# Patient Record
Sex: FEMALE | Race: WHITE
[De-identification: names, ages, dates, MRNs, and addresses within clinical notes are randomized per-mention and may not be internally consistent; named-entity substitution may affect disease eponyms.]

---

## 2018-01-19 ENCOUNTER — HOSPITAL ENCOUNTER (EMERGENCY)
Dept: HOSPITAL 84 - D.ER | Age: 33
LOS: 1 days | Discharge: HOME | End: 2018-01-20
Payer: MEDICAID

## 2018-01-19 VITALS — BODY MASS INDEX: 29.7 KG/M2

## 2018-01-19 DIAGNOSIS — N39.0: Primary | ICD-10-CM

## 2018-01-19 DIAGNOSIS — F17.200: ICD-10-CM

## 2018-01-19 LAB
APPEARANCE UR: (no result)
BACTERIA #/AREA URNS HPF: (no result) /HPF
BASOPHILS NFR BLD AUTO: 0.2 % (ref 0–2)
BILIRUB SERPL-MCNC: NEGATIVE MG/DL
COLOR UR: YELLOW
EOSINOPHIL NFR BLD: 0.6 % (ref 0–7)
ERYTHROCYTE [DISTWIDTH] IN BLOOD BY AUTOMATED COUNT: 12.5 % (ref 11.5–14.5)
GLUCOSE SERPL-MCNC: NEGATIVE MG/DL
HCT VFR BLD CALC: 42 % (ref 36–48)
HGB BLD-MCNC: 14.5 G/DL (ref 12–16)
IMM GRANULOCYTES NFR BLD: 0.3 % (ref 0–5)
KETONES UR STRIP-MCNC: NEGATIVE MG/DL
LYMPHOCYTES NFR BLD AUTO: 12.6 % (ref 15–50)
MCH RBC QN AUTO: 29.5 PG (ref 26–34)
MCHC RBC AUTO-ENTMCNC: 34.5 G/DL (ref 31–37)
MCV RBC: 85.5 FL (ref 80–100)
MONOCYTES NFR BLD: 8.6 % (ref 2–11)
MUCOUS THREADS #/AREA URNS LPF: (no result) /LPF
NEUTROPHILS NFR BLD AUTO: 77.7 % (ref 40–80)
NITRITE UR-MCNC: POSITIVE MG/ML
PH UR STRIP: 5 [PH] (ref 5–6)
PLATELET # BLD: 271 10X3/UL (ref 130–400)
PMV BLD AUTO: 9.3 FL (ref 7.4–10.4)
PROT UR-MCNC: (no result) MG/DL
RBC # BLD AUTO: 4.91 10X6/UL (ref 4–5.4)
SP GR UR STRIP: 1.01 (ref 1–1.02)
SQUAMOUS #/AREA URNS HPF: (no result) /HPF (ref 0–5)
UROBILINOGEN UR-MCNC: NORMAL MG/DL
WBC # BLD AUTO: 11.1 10X3/UL (ref 4.8–10.8)
WBC #/AREA URNS HPF: (no result) /HPF (ref 0–5)

## 2018-01-20 LAB
ALBUMIN SERPL-MCNC: 3.7 G/DL (ref 3.4–5)
ALP SERPL-CCNC: 48 U/L (ref 46–116)
ALT SERPL-CCNC: 32 U/L (ref 10–68)
AMYLASE SERPL-CCNC: 65 U/L (ref 25–115)
ANION GAP SERPL CALC-SCNC: 13.2 MMOL/L (ref 8–16)
BILIRUB SERPL-MCNC: 0.15 MG/DL (ref 0.2–1.3)
BUN SERPL-MCNC: 12 MG/DL (ref 7–18)
CALCIUM SERPL-MCNC: 9.1 MG/DL (ref 8.5–10.1)
CHLORIDE SERPL-SCNC: 104 MMOL/L (ref 98–107)
CO2 SERPL-SCNC: 23.7 MMOL/L (ref 21–32)
CREAT SERPL-MCNC: 0.8 MG/DL (ref 0.6–1.3)
GLOBULIN SER-MCNC: 3.7 G/L
GLUCOSE SERPL-MCNC: 115 MG/DL (ref 74–106)
LIPASE SERPL-CCNC: 209 U/L (ref 73–393)
OSMOLALITY SERPL CALC.SUM OF ELEC: 274 MOSM/KG (ref 275–300)
POTASSIUM SERPL-SCNC: 3.9 MMOL/L (ref 3.5–5.1)
PROT SERPL-MCNC: 7.4 G/DL (ref 6.4–8.2)
SODIUM SERPL-SCNC: 137 MMOL/L (ref 136–145)

## 2018-06-03 ENCOUNTER — HOSPITAL ENCOUNTER (EMERGENCY)
Dept: HOSPITAL 84 - D.ER | Age: 33
Discharge: HOME | End: 2018-06-03
Payer: SELF-PAY

## 2018-06-03 VITALS — BODY MASS INDEX: 29.7 KG/M2

## 2018-06-03 DIAGNOSIS — H57.13: Primary | ICD-10-CM
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
## 2018-06-03 DIAGNOSIS — S05.02XA: ICD-10-CM

## 2018-06-03 DIAGNOSIS — S05.01XA: ICD-10-CM
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
## 2018-06-03 DIAGNOSIS — F17.200: ICD-10-CM

## 2019-02-15 ENCOUNTER — HOSPITAL ENCOUNTER (INPATIENT)
Dept: HOSPITAL 84 - D.LD | Age: 34
LOS: 21 days | Discharge: HOME | End: 2019-03-08
Attending: OBSTETRICS & GYNECOLOGY | Admitting: OBSTETRICS & GYNECOLOGY
Payer: COMMERCIAL

## 2019-02-15 VITALS — WEIGHT: 180 LBS | BODY MASS INDEX: 28.93 KG/M2 | BODY MASS INDEX: 28.93 KG/M2 | HEIGHT: 66 IN

## 2019-02-15 DIAGNOSIS — Z3A.39: ICD-10-CM

## 2019-02-15 DIAGNOSIS — O36.8130: Primary | ICD-10-CM

## 2019-03-06 VITALS — DIASTOLIC BLOOD PRESSURE: 70 MMHG | SYSTOLIC BLOOD PRESSURE: 133 MMHG

## 2019-03-06 LAB
ALBUMIN SERPL-MCNC: 2.3 G/DL (ref 3.4–5)
ALP SERPL-CCNC: 136 U/L (ref 46–116)
ALT SERPL-CCNC: 11 U/L (ref 10–68)
AMPHETAMINES UR QL SCN: NEGATIVE QUAL
ANION GAP SERPL CALC-SCNC: 13.8 MMOL/L (ref 8–16)
BARBITURATES UR QL SCN: NEGATIVE QUAL
BENZODIAZ UR QL SCN: NEGATIVE QUAL
BILIRUB SERPL-MCNC: 0.14 MG/DL (ref 0.2–1.3)
BUN SERPL-MCNC: 10 MG/DL (ref 7–18)
BZE UR QL SCN: NEGATIVE QUAL
CALCIUM SERPL-MCNC: 8 MG/DL (ref 8.5–10.1)
CANNABINOIDS UR QL SCN: NEGATIVE QUAL
CHLORIDE SERPL-SCNC: 104 MMOL/L (ref 98–107)
CO2 SERPL-SCNC: 23.7 MMOL/L (ref 21–32)
CREAT SERPL-MCNC: 0.5 MG/DL (ref 0.6–1.3)
ERYTHROCYTE [DISTWIDTH] IN BLOOD BY AUTOMATED COUNT: 13.3 % (ref 11.5–14.5)
GLOBULIN SER-MCNC: 3.7 G/L
GLUCOSE SERPL-MCNC: 79 MG/DL (ref 74–106)
HCT VFR BLD CALC: 34.8 % (ref 36–48)
HGB BLD-MCNC: 11.7 G/DL (ref 12–16)
LDH1 SERPL-CCNC: 156 U/L (ref 81–234)
MCH RBC QN AUTO: 28.7 PG (ref 26–34)
MCHC RBC AUTO-ENTMCNC: 33.6 G/DL (ref 31–37)
MCV RBC: 85.5 FL (ref 80–100)
OPIATES UR QL SCN: NEGATIVE QUAL
OSMOLALITY SERPL CALC.SUM OF ELEC: 273 MOSM/KG (ref 275–300)
PCP UR QL SCN: NEGATIVE QUAL
PLATELET # BLD: 251 10X3/UL (ref 130–400)
PMV BLD AUTO: 10 FL (ref 7.4–10.4)
POTASSIUM SERPL-SCNC: 3.5 MMOL/L (ref 3.5–5.1)
PROT SERPL-MCNC: 6 G/DL (ref 6.4–8.2)
RBC # BLD AUTO: 4.07 10X6/UL (ref 4–5.4)
SODIUM SERPL-SCNC: 138 MMOL/L (ref 136–145)
URATE SERPL-MCNC: 4.3 MG/DL (ref 2.6–7.2)
WBC # BLD AUTO: 10.4 10X3/UL (ref 4.8–10.8)

## 2019-03-06 PROCEDURE — 10907ZC DRAINAGE OF AMNIOTIC FLUID, THERAPEUTIC FROM PRODUCTS OF CONCEPTION, VIA NATURAL OR ARTIFICIAL OPENING: ICD-10-PCS | Performed by: OBSTETRICS & GYNECOLOGY

## 2019-03-06 PROCEDURE — 3E033VJ INTRODUCTION OF OTHER HORMONE INTO PERIPHERAL VEIN, PERCUTANEOUS APPROACH: ICD-10-PCS | Performed by: OBSTETRICS & GYNECOLOGY

## 2019-03-07 VITALS — DIASTOLIC BLOOD PRESSURE: 62 MMHG | SYSTOLIC BLOOD PRESSURE: 126 MMHG

## 2019-03-07 LAB
CREATININE - URINE: 71.3 MG/DL (ref 30–125)
PROT UR-MCNC: 35.3 MG/DL (ref 0–11.9)
PROT/CREAT UR: 0.5 MG/G

## 2019-03-07 PROCEDURE — 0HB9XZZ EXCISION OF PERINEUM SKIN, EXTERNAL APPROACH: ICD-10-PCS | Performed by: OBSTETRICS & GYNECOLOGY

## 2019-03-07 PROCEDURE — 0HQ9XZZ REPAIR PERINEUM SKIN, EXTERNAL APPROACH: ICD-10-PCS | Performed by: OBSTETRICS & GYNECOLOGY

## 2019-03-07 NOTE — NUR
Called to room,  pt states that she feels increased pressure in perineum and
believes this is being caused by hemorrhoids that she noticed while showering,
Orders checked and pharmacy notified for analpram cream.

## 2019-03-07 NOTE — NUR
AM assessement completed as charted on flowsheet.  pt complains of back aches
that she rates at 5/10 as well as lower abd cramping. Fundus firm at u/u with
light bleeding noted to geoff pad,  she denies clots with voids and that
bleeding is less than her normal period.  saline lock to left hand patent and
flushed easily with 5ml NS.  Epidural cath noted to be still be in place as
capped off, due to pt scheduled BTL later today.  Meds given per orders as
scanned to emar. side rails up x 2 with phone and call light in reach.

## 2019-03-07 NOTE — NUR
infant taken to nursery via crib with nursery nurse.  Called to room by pt to
remove epidural cath.  Pt sitting up on side of bed, epidural cath removed
easily with black tip intact and verified this with patient.  Saline lock
removed intact also due to pt complaint of tenderness with touch. No reddness
noted prior to removeal.  Provided with towels for shower when she is ready.

## 2019-03-07 NOTE — NUR
Pt rates pain at 3/10 and denies "achy" feeling in her back. she also states
that after talking with her spouse more she has choosen to wait on tubal
ligation.  Denies any questions or concerns at this time.  Side rails up x 2
with phone and call light in reach and infant in crib at bedside.

## 2019-03-07 NOTE — NUR
ROUNDS MADE. PT WITH INFANT SKIN TO SKIN. DENIES NEEDS AT THIS TIME. BED IN
LOW POSITION WITH UPPER SIDE RAILS RAISED X2. CALL LIGHT AND PHONE WITHIN
REACH. WILL CONTINUE TO MONITOR AND ASSIST PRN.

## 2019-03-07 NOTE — NUR
PT'S GRANDMOTHER TO NURSE DESK REQUESTING [employer] FOR PT TO SHOWER. SAME
PROVIDED. PACIFIER PROVIDED PER REQUEST. NO FURTHER NEEDS VOICED.

## 2019-03-07 NOTE — NUR
RESTING WITH EYES CLOSED IN SEMI-FOWLERS POSITION. INFANT RESTING IN OPEN
CRIB. RESPIRATIONS REGULAR AND UNLABORED, NO S/S OF DISTRESS NOTED. BED IN LOW
POSITION WITH UPPER SIDE RAILS RAISED X2. CALL LIGHT AND PHONE WITHIN REACH.
WILL CONTINUE TO MONITOR AND ASSIST PRN.

## 2019-03-07 NOTE — NUR
CONVERSING WITH VISITORS AND OLDER CHILDREN AT THIS TIME. REQUESTS ASSESSMENT
BE COMPLETED FOLLOWING FAMILY LEAVING UNIT. STATES THAT SHE WILL NOTIFY RN
WHEN FAMILY LEAVES. STATES PAIN 3/10 AND THAT TYLENOL AND MOTRIN HELPED
RELIEVE PAIN TREMENDOUSLY. DENIES NEEDS AT THIS TIME. BED IN LOW POSITION WITH
UPPER SIDE RAILS RAISED X2. CALL LIGHT AND PHONE WITHIN REACH. WILL CONTINUE
TO MONITOR AND ASSIST PRN.

## 2019-03-07 NOTE — NUR
ROUNDS MADE. PT RESTING ON LEFT SIDE. RESPIRATIONS REGULAR AND UNLABORED, NO
S/S OF DISTRESS NOTED. INFANT RESTING IN OPEN CRIB AT BEDSIDE. BED IN LOW
POSITION WITH UPPER SIDE RAILS RAISED X2. CALL LIGHT AND PHONE WITHIN REACH.
WILL CONTINUE TO MONITOR AND ASSIST PRN.

## 2019-03-07 NOTE — NUR
Pt calls out requesting motrin and or tylenol if possible.  This RN to room
with meds given as scanned to emar. Additonal geoff pads placed in bathroom as
requested.  Family at bedside and infant in room.  Call light within patient
reach.

## 2019-03-07 NOTE — NUR
ROUNDS MADE. NO VISITORS PRESENT. PT STATES THAT RN CAN PERFORM SHIFT
ASSESSMENT. VSS. FUNDUS FIRM, MIDLINE AND U2 WITH SMALL AMT RUBRA LOCHIA TO
PERIPAD, NO CLOTS NOTED. PT DENIES PASSING CLOTS. STATES THAT SHE IF VOIDING
WITHOUT DIFFICULTY AND PASSING FLATUS. RESPIRATION REGULAR AND UNLABORED,
BREATH SOUNDS CLEAR BILATERALLY AND EQUAL. BOWEL SOUNDS PRESENT AND ACTIVE X4.
NO PERINEAL SWELLING NOTED. LACERATION WITH REPAIR NOTED, WELL APPROXIMATED
WITH NO S/S OF INFECTION NOTED. HEMMORHOIDS NOTED, PT REPORTS THAT SHE HAS
CREAM SHE HAS BEEN USING AND IT IS RELIEVING DISCOMFORT WELL. REPORTS THAT SHE
IS USING DERMAPLAST AND PERIBOTTLE WITH BETADINE AND WATER FOLLOWING EACH
BATHROOM USE. STATES PAIN 2-3/10, ABD CRAMPING "MAINLY AFTER
BREASTFEEDING."DENIES NEED FOR INTERVENTION AT THIS TIME. STATES THAT TYLENOL
AND MOTRIN HELPED TREMENDOUSLY RELIEVE DISCOMFORT. BED IN LOW POSITION WITH
UPPER SIDE RAILS RAISED X2. CALL LIGHT AND PHONE WITHIN REACH. WILL CONTINUE
TO MONITOR AND ASSIST PRN.

## 2019-03-07 NOTE — NUR
PATIENT AMBULATED TO ROOM 1273 WITH STEADY GAIT. ORIENTED TO ROOM AND CALL
SYSTEM. BED IN LOWEST POSITION, SIDE RAILS UP X 2, C/L AND WATER WITHIN REACH.
INFANT IN CRIB AT BEDSIDE. DENIES ANY NEEDS OR CONCERNS. NO SIGNS OF DISTRESS
NOTED.

## 2019-03-08 VITALS — SYSTOLIC BLOOD PRESSURE: 114 MMHG | DIASTOLIC BLOOD PRESSURE: 59 MMHG

## 2019-03-08 LAB
BASOPHILS NFR BLD AUTO: 0.3 % (ref 0–2)
EOSINOPHIL NFR BLD: 1.8 % (ref 0–7)
ERYTHROCYTE [DISTWIDTH] IN BLOOD BY AUTOMATED COUNT: 13.6 % (ref 11.5–14.5)
HCT VFR BLD CALC: 33.6 % (ref 36–48)
HGB BLD-MCNC: 11.1 G/DL (ref 12–16)
IMM GRANULOCYTES NFR BLD: 0.2 % (ref 0–5)
LYMPHOCYTES NFR BLD AUTO: 24.7 % (ref 15–50)
MCH RBC QN AUTO: 29 PG (ref 26–34)
MCHC RBC AUTO-ENTMCNC: 33 G/DL (ref 31–37)
MCV RBC: 87.7 FL (ref 80–100)
MONOCYTES NFR BLD: 9.2 % (ref 2–11)
NEUTROPHILS NFR BLD AUTO: 63.8 % (ref 40–80)
PLATELET # BLD: 223 10X3/UL (ref 130–400)
PMV BLD AUTO: 10.1 FL (ref 7.4–10.4)
RBC # BLD AUTO: 3.83 10X6/UL (ref 4–5.4)
WBC # BLD AUTO: 9 10X3/UL (ref 4.8–10.8)

## 2019-03-08 NOTE — NUR
PAIN REASSESSMENT COMPLETED. 1/10. CURRENTLY BREAST FEEDING. DENIES NEED FOR
ADDITIONAL INTERVENTION. BED IN LOW POSITION WITH UPPER SIDE RAILS RAISED X2.
CALL LIGHT AND PHONE WITHIN REACH. WILL CONTINUE TO MONITOR AND ASSIST PRN.

## 2019-03-08 NOTE — NUR
AMBULATORY IN [place]. 3/10 ABD CRAMPING. SCHEDULED TYLENOL GIVEN PER ORDER AND
PT REQUEST. SODA PROVIDED. DENIES ADDITIONAL NEEDS AT THIS TIME. WILL CONTINUE
TO MONITOR AND ASSIST PRN.

## 2019-03-08 NOTE — NUR
ROUNDS MADE. PT RESTING WITH EYES CLOSED LAYING ON RIGHT SIDE. INFANT IN NBN
AT THIS TIME. RESPIRATIONS REGULAR AND UNLABORED, NO S/S OF DISTRESS NOTED.
BED IN LOW POSITION WITH UPPER SIDE RAILS RAISED X2. CALL LIGHT AND PHONE
WITHIN REACH. WILL CONTINUE TO MONITOR AND ASSIST PRN.

## 2019-03-08 NOTE — NUR
ROUNDS MADE. 3/10, ABD CRAMPING DURING AND FOLLOWING BREASTFEEDING. TYLENOL
GIVEN PER ORDER. DENIES NEED FOR ADDITIONAL INTERVENTION. ICE WATER PROVIDED.
BONDING WITH INFANT SKIN TO SKIN. QUESTIONS REGARDING NIPPLE SHIELD USE
ANSWERED. BED IN LOW POSITION WITH UPPER SIDE RAILS RAISED X2. CALL LIGHT AND
PHONE WITHIN REACH. WILL CONTINUE TO MONITOR AND ASSIST PRN.

## 2019-03-08 NOTE — NUR
discharge inst verbal and written given. pfw post partum inst given. awhonn
post birth warning signs form given. smoking kesha. infor given- denied wanting
referral to counselor for smoking cessation. discharge inst on breast feeding,
post vag del, post partum depression ect given to pt. pt health summary given.
pt med rec given to pt. pt denies any questions.

## 2019-03-08 NOTE — NUR
Jessica Lynch
[birth year]
 
S: Patient states this is her fourth baby but first one breastfeeding. She
thinks things are going good with nursing. Asked if CLC can make sure infant
latch is correct, how often she infant eat, and how does she know if her body
is making enough. Patient states infant needs to eat now. Denies pain or
discomfort with nursing.
 
O: Patient sitting up in bed holding infant. Congratulated  on deliver and
asked how are things going with breastfeeding. Breastfeeding takes time,
practice, and patience in the beginning. Breastfeeding is a learn experience
for both mother and infant. Explained infant feeding cues, breastmilk
composition, benefits of skin to skin, breastfeeding positions, how to verify
infant latch is correct, supply and demand, and normal feeding patterns for a
 infant. Encouraged to practice responsive feeding to help with
establishing her milk supply. It is normal for infant to need to be stimulated
and waken to feed. Please ask for help as needed. Explained how to hold infant
in laid back breastfeeding position. Infant was stimulated by patient and
placed on the right breast at 8:20. Infant had round cheeks, mouth 140
degrees, sucking in a rocking motion, and appears content with feeding. Asked
if any pain or discomfort with breastfeeding. Observed infant sucking
and could hear infant swallowing .Observed
patient nipples no
signs of redness or trauma. Explained to help prevent engorgement when she
does home. Please ask for help as needed. Infant remained latched with nursing
as CLC left room.
 
A: Patient nursing infant and doing well. Has some questions about
breastfeeding.
 
P: Continue to support breastfeeding during hospital visit.
 
Vani Ospina, CLC

## 2019-03-08 NOTE — NUR
3/10, INTERMITTENT ABD CRAMPING. MOTRIN GIVEN PER ORDER. SANDWICH TRAY,
APPLESAUCE, CHIPS, AND SODA PROVIDED PER REQUEST. INFANT TO NBN PER PT
REQUEST. STATES THAT NBN RN REQUESTED INFANT FOLLOWING FEEDING FOR HEARING
SCREEN TO BE DONE. DENIES ADDITIONAL NEEDS. INSTRUCTED ON CALLING NBN FROM
ROOM PHONE PRN, VERBALIZES UNDERSTANDING. BED IN LOW POSITION WITH UPPER SIDE
RAILS RAISED X2. CALL LIGHT AND PHONE WITHIN REACH. WILL CONTINUE TO MONITOR
AND ASSIST PRN.

## 2019-03-08 NOTE — NUR
PAIN REASSESSMENT NOTED. DENIES PAIN AT THIS TIME. BREASTFEEDING INFANT. ICE
WATER PROVIDED. DENIES ADDITIONAL NEEDS AT THIS TIME. BED IN LOW POSITION WITH
UPPER SIDE RAILS RAISED X2. CALL LIGHT AND PHONE WITHIN REACH. WILL CONTINUE
TO MONITOR AND ASSIST PRN.

## 2019-03-08 NOTE — NUR
ASSESSMENT DONE. STATES THAT HAS SOME CRAMPING AND PAIN AT PERINEAL AREA-
STITCHES. STATES THAT IS IMPROVED WITH SCHEDULED TYLENOL AND IBUPROFEN. DENIES
NEEDS AT THIS TIME. LACTATION CONULTANT AT BEDSIDE ASSISTING PT. DENIES NEEDS.